# Patient Record
Sex: FEMALE | Race: WHITE | Employment: FULL TIME | URBAN - METROPOLITAN AREA
[De-identification: names, ages, dates, MRNs, and addresses within clinical notes are randomized per-mention and may not be internally consistent; named-entity substitution may affect disease eponyms.]

---

## 2023-01-25 ENCOUNTER — HOSPITAL ENCOUNTER (EMERGENCY)
Age: 28
Discharge: HOME | End: 2023-01-25
Attending: EMERGENCY MEDICINE
Payer: COMMERCIAL

## 2023-01-25 ENCOUNTER — APPOINTMENT (OUTPATIENT)
Dept: GENERAL RADIOLOGY | Age: 28
End: 2023-01-25
Payer: COMMERCIAL

## 2023-01-25 VITALS
SYSTOLIC BLOOD PRESSURE: 116 MMHG | RESPIRATION RATE: 15 BRPM | WEIGHT: 176.1 LBS | DIASTOLIC BLOOD PRESSURE: 79 MMHG | TEMPERATURE: 97.9 F | HEIGHT: 65 IN | BODY MASS INDEX: 29.34 KG/M2 | HEART RATE: 72 BPM | OXYGEN SATURATION: 99 %

## 2023-01-25 DIAGNOSIS — G60.9 IDIOPATHIC PERIPHERAL NEUROPATHY: Primary | ICD-10-CM

## 2023-01-25 DIAGNOSIS — M79.641 RIGHT HAND PAIN: ICD-10-CM

## 2023-01-25 PROCEDURE — 73110 X-RAY EXAM OF WRIST: CPT

## 2023-01-25 PROCEDURE — 99283 EMERGENCY DEPT VISIT LOW MDM: CPT

## 2023-01-25 PROCEDURE — 73130 X-RAY EXAM OF HAND: CPT

## 2023-01-25 RX ORDER — MOMETASONE FUROATE AND FORMOTEROL FUMARATE DIHYDRATE 200; 5 UG/1; UG/1
AEROSOL RESPIRATORY (INHALATION)
COMMUNITY
Start: 2023-01-02

## 2023-01-25 RX ORDER — LEVONORGESTREL 19.5 MG/1
1 INTRAUTERINE DEVICE INTRAUTERINE ONCE
COMMUNITY
Start: 2022-02-16

## 2023-01-25 RX ORDER — ESCITALOPRAM OXALATE 10 MG/1
TABLET ORAL
COMMUNITY
Start: 2022-11-28

## 2023-01-25 ASSESSMENT — ENCOUNTER SYMPTOMS
DIARRHEA: 0
TROUBLE SWALLOWING: 0
BACK PAIN: 0
SORE THROAT: 0
VOMITING: 0
EYES NEGATIVE: 1
NAUSEA: 0
SHORTNESS OF BREATH: 0
CHEST TIGHTNESS: 0
CONSTIPATION: 0
COUGH: 0
ABDOMINAL PAIN: 0
FACIAL SWELLING: 0
COLOR CHANGE: 0

## 2023-01-25 ASSESSMENT — LIFESTYLE VARIABLES: HOW OFTEN DO YOU HAVE A DRINK CONTAINING ALCOHOL: NEVER

## 2023-01-25 ASSESSMENT — PAIN - FUNCTIONAL ASSESSMENT: PAIN_FUNCTIONAL_ASSESSMENT: NONE - DENIES PAIN

## 2023-01-25 NOTE — ED PROVIDER NOTES
ED Attending Attestation Note     Date of evaluation: 1/25/2023    This patient was seen by the advance practice provider. I have seen and examined the patient, agree with the workup, evaluation, management and diagnosis. The care plan has been discussed. My assessment reveals patient with right hand/wrist weakness for a week. No discernable injury. Exam showing peripheral neuropathy involving multiple peripheral nerves. Bruising and swelling on dorsal surface on right hand. Xray negative. Will splint and have patient followup with hand.      Consuelo Grey MD  01/25/23 9737

## 2023-01-25 NOTE — ED TRIAGE NOTES
Patient complains of right hand pain x 1 week. Patient reports that she is unable to bend wrist or move fingers. Patient unable to .

## 2023-01-25 NOTE — ED PROVIDER NOTES
[address] [facility] [facility]          PHYSICIAN ASSISTANT NOTE       Date of evaluation: 1/25/2023    Chief Complaint     Hand Pain (Right hand pain. Started after moving furniture. Urgent care advised ED visit. Patient states \"I am unable to straighten out my wrist or move my fingers\"   Patient requests MRI)      History of Present Illness     Ye Steve is a 32 y.o. female who presents with complaints of right wrist and hand pain. Patient states she has been moving and doing repetitive motion with her right hand. Patient states over the last week she has noticed some pain to her wrist and hand. She is right-hand dominant. She denies any direct injury or trauma to the hand. She states she started noticing numbness and tingling to the fingers of the right hand over the last few days. She states when she woke up this morning she was unable to move her wrist or fingers. She does complain of swelling to the dorsal right hand. Denies any erythema or warmth to touch. She does complain of some pain with movement. Denies any pain up the arm or swelling of the arm itself. Denies any fevers or chills. She has no history of IV drug use. She was sent here by urgent care for further evaluation. She also complains of some weakness to the right hand. ASSESSMENT / PLAN  (MEDICAL DECISION MAKING)     INITIAL VITALS: BP: 116/79, Temp: 97.9 °F (36.6 °C), Heart Rate: 72, Resp: 15, SpO2: 99 %    Ye Steve is a 32 y.o. female presents here with numbness, pain, swelling and weakness to the right hand. Patient does also have some numbness and tingling to the right hand. She has no other acute neurological deficits. This is likely peripheral neuropathy. Exam does seem to show a peripheral neuropathy or nerve palsy to the ulnar and radial nerve and possible median nerve. She is able to move all her fingers and wrist but does have some tingling and weakness.   Patient has no symptoms to the lower extremities or bilateral symptoms. There is no signs of a stroke or slurred speech or facial droop. She has no infectious symptoms. Likely from repetitive motion and she also had some soft tissue swelling over the right hand but no signs of infection, erythema or warmth to touch. Right hand and right wrist x-rays were negative. Patient was placed in a Velcro wrist plan for comfort. She will be referred to hand for follow-up and possibly need EMG as outpatient. She was given close instructions to return if any worsening pain, weakness, swelling, numbness or worsening symptoms return the emergency department. She should wear the Velcro wrist splint until follow-up with hand. Rest ice elevate. Patient can take Tylenol or ibuprofen as needed for pain. At this point patient is stable for discharge. Medical Decision Making  Problems Addressed:  Idiopathic peripheral neuropathy: acute illness or injury  Right hand pain: acute illness or injury    Amount and/or Complexity of Data Reviewed  Radiology: ordered. Risk  OTC drugs. This patient was also evaluated by the attending physician. All care plans were discussed and agreed upon. Clinical Impression     1. Idiopathic peripheral neuropathy    2.  Right hand pain        Disposition     PATIENT REFERRED TO:  [facility] Emergency Department  [address] [place] [address]  [phone]    If symptoms worsen    MD Regina Izaguirre [address] [facility] [address]    Schedule an appointment as soon as possible for a visit in 2 days  with hand specialist    [place]  [address]    Schedule an appointment as soon as possible for a visit in 2 days  with hand specialist    DISCHARGE MEDICATIONS:  New Prescriptions    No medications on file       DISPOSITION Decision To Discharge 01/25/2023 02:23:34 PM        Diagnostic Results and Other Data RADIOLOGY:  XR HAND RIGHT (MIN 3 VIEWS)   Final Result      1. Normal right hand   2. Normal right wrist      XR WRIST RIGHT (MIN 3 VIEWS)   Final Result      1. Normal right hand   2. Normal right wrist        RECENT VITALS:  BP: 116/79, Temp: 97.9 °F (36.6 °C), Heart Rate: 72, Resp: 15, SpO2: 99 %     Procedures       ED Course     Nursing Notes, Past Medical Hx,Past Surgical Hx, Social Hx, Allergies, and Family Hx were reviewed. The patient was given the following medications:  No orders of the defined types were placed in this encounter. CONSULTS:  None    Review of Systems     Review of Systems   Constitutional:  Negative for chills and fever. HENT: Negative. Negative for congestion, facial swelling, postnasal drip, sore throat and trouble swallowing. Eyes: Negative. Negative for visual disturbance. Respiratory:  Negative for cough, chest tightness and shortness of breath. Cardiovascular:  Negative for chest pain and palpitations. Gastrointestinal:  Negative for abdominal pain, constipation, diarrhea, nausea and vomiting. Genitourinary:  Negative for dysuria, flank pain, frequency, pelvic pain, urgency, vaginal bleeding and vaginal discharge. Musculoskeletal:  Positive for arthralgias. Negative for back pain, myalgias, neck pain and neck stiffness. Skin: Negative. Negative for color change and rash. Neurological:  Positive for numbness. Negative for dizziness, syncope, light-headedness and headaches. Hematological:  Negative for adenopathy. Psychiatric/Behavioral: Negative. All other systems reviewed and are negative. Past Medical, Surgical, Family, and Social History     She has no past medical history on file. She has no past surgical history on file. Her family history is not on file. She reports that she has never smoked. She has never been exposed to tobacco smoke.  She has never used smokeless tobacco. She reports that she does not drink alcohol and does not use drugs. Medications     Previous Medications    DULERA 200-5 MCG/ACT INHALER        ESCITALOPRAM (LEXAPRO) 10 MG TABLET        LEVONORGESTREL (KYLEENA) IUD 19.5 MG    1 Intra Uterine Device by IntraUTERine route once       Allergies     She has No Known Allergies. Physical Exam     INITIAL VITALS: BP: 116/79, Temp: 97.9 °F (36.6 °C), Heart Rate: 72, Resp: 15, SpO2: 99 %  Physical Exam  Vitals and nursing note reviewed. Constitutional:       General: She is not in acute distress. Appearance: Normal appearance. She is well-developed. She is not diaphoretic. HENT:      Head: Normocephalic and atraumatic. Right Ear: External ear normal.      Left Ear: External ear normal.      Nose: Nose normal.      Mouth/Throat:      Pharynx: Oropharynx is clear. No oropharyngeal exudate. Eyes:      General: No scleral icterus. Conjunctiva/sclera: Conjunctivae normal.      Pupils: Pupils are equal, round, and reactive to light. Neck:      Thyroid: No thyromegaly. Vascular: No JVD. Trachea: No tracheal deviation. Cardiovascular:      Rate and Rhythm: Normal rate and regular rhythm. Pulses: Normal pulses. Heart sounds: Normal heart sounds. No murmur heard. No friction rub. No gallop. Pulmonary:      Effort: Pulmonary effort is normal. No respiratory distress. Breath sounds: Normal breath sounds. No stridor. No wheezing or rales. Chest:      Chest wall: No tenderness. Abdominal:      General: Bowel sounds are normal. There is no distension. Palpations: Abdomen is soft. There is no mass. Tenderness: There is no abdominal tenderness. There is no right CVA tenderness, left CVA tenderness, guarding or rebound. Musculoskeletal:         General: Swelling and tenderness present. Normal range of motion. Cervical back: Normal range of motion and neck supple. No tenderness.       Comments: Patient has some tenderness mostly to the dorsal right hand with some soft tissue swelling. No snuffbox tenderness. She is able to extend at the right wrist as well as flex the right wrist but does have some weakness to the right wrist extension. She is able to move her fingers of the right hand. 2+ radial pulse right upper extremity. No erythema or warmth to touch noted. Full range of motion of right elbow and right shoulder. Lymphadenopathy:      Cervical: No cervical adenopathy. Skin:     General: Skin is warm and dry. Neurological:      General: No focal deficit present. Mental Status: She is alert and oriented to person, place, and time. Cranial Nerves: No cranial nerve deficit. Sensory: Sensory deficit present. Motor: Weakness present. No abnormal muscle tone. Coordination: Coordination normal.      Gait: Gait normal.      Deep Tendon Reflexes: Reflexes normal.      Comments: Cranial nerves intact. No slurred speech or facial droop. Ambulatory without difficulty. Patient does appear to have a possible right wrist drop but she is able to extend the right wrist.  She does have some weakness to right wrist extension. She also has weakness to hand abduction. 4-5  strength of the right hand. Full range of motion of other extremities with 5 out of 5 strength of all the extremities.    Psychiatric:         Behavior: Behavior normal.          Mojgan Acharya [place]  01/25/23 [address] [facility],[address] [facility], [place]  01/25/23 [address] [facility],[address] [facility], [place]  01/25/23 [id number]

## 2023-01-25 NOTE — ED NOTES
Patient discharged to home via family. Written discharge instructions reviewed with understanding. Copy of AVS sent home with patient. Patient able to walk from ED without assistance.         Alyssa Arce RN  01/25/23 3535

## 2023-01-25 NOTE — DISCHARGE INSTRUCTIONS
Wear wrist splint with activity. Rest, ice packs 15 minutes 2-3 times daily, elevate. May alternate Tylenol and ibuprofen as needed for pain or inflammation or swelling. If further weakness, numbness, unable to move your hand, redness or swelling or worsening symptoms return the emergency department.